# Patient Record
Sex: MALE | Race: WHITE | NOT HISPANIC OR LATINO | URBAN - METROPOLITAN AREA
[De-identification: names, ages, dates, MRNs, and addresses within clinical notes are randomized per-mention and may not be internally consistent; named-entity substitution may affect disease eponyms.]

---

## 2019-03-25 ENCOUNTER — EMERGENCY (EMERGENCY)
Facility: HOSPITAL | Age: 84
LOS: 1 days | Discharge: HOME | End: 2019-03-25
Attending: EMERGENCY MEDICINE
Payer: MEDICARE

## 2019-03-25 VITALS — OXYGEN SATURATION: 100 % | RESPIRATION RATE: 17 BRPM

## 2019-03-25 VITALS — WEIGHT: 143.96 LBS | HEIGHT: 69 IN | TEMPERATURE: 98 F

## 2019-03-25 LAB
ALBUMIN SERPL ELPH-MCNC: 4.3 G/DL — SIGNIFICANT CHANGE UP (ref 3.3–5)
ALP SERPL-CCNC: 76 U/L — SIGNIFICANT CHANGE UP (ref 40–120)
ALT FLD-CCNC: 14 U/L — SIGNIFICANT CHANGE UP (ref 10–45)
ANION GAP SERPL CALC-SCNC: 14 MMOL/L — SIGNIFICANT CHANGE UP (ref 5–17)
APTT BLD: 35 SEC — SIGNIFICANT CHANGE UP (ref 27.5–36.3)
AST SERPL-CCNC: 22 U/L — SIGNIFICANT CHANGE UP (ref 10–40)
BASOPHILS # BLD AUTO: 0 K/UL — SIGNIFICANT CHANGE UP (ref 0–0.2)
BASOPHILS NFR BLD AUTO: 0.4 % — SIGNIFICANT CHANGE UP (ref 0–2)
BILIRUB SERPL-MCNC: 0.8 MG/DL — SIGNIFICANT CHANGE UP (ref 0.2–1.2)
BUN SERPL-MCNC: 20 MG/DL — SIGNIFICANT CHANGE UP (ref 7–23)
CALCIUM SERPL-MCNC: 9.5 MG/DL — SIGNIFICANT CHANGE UP (ref 8.4–10.5)
CHLORIDE SERPL-SCNC: 102 MMOL/L — SIGNIFICANT CHANGE UP (ref 96–108)
CO2 SERPL-SCNC: 22 MMOL/L — SIGNIFICANT CHANGE UP (ref 22–31)
CREAT SERPL-MCNC: 1.05 MG/DL — SIGNIFICANT CHANGE UP (ref 0.5–1.3)
EOSINOPHIL # BLD AUTO: 0.1 K/UL — SIGNIFICANT CHANGE UP (ref 0–0.5)
EOSINOPHIL NFR BLD AUTO: 0.9 % — SIGNIFICANT CHANGE UP (ref 0–6)
GLUCOSE SERPL-MCNC: 150 MG/DL — HIGH (ref 70–99)
HCT VFR BLD CALC: 35.8 % — LOW (ref 39–50)
HGB BLD-MCNC: 12.4 G/DL — LOW (ref 13–17)
INR BLD: 2.36 RATIO — HIGH (ref 0.88–1.16)
LYMPHOCYTES # BLD AUTO: 0.8 K/UL — LOW (ref 1–3.3)
LYMPHOCYTES # BLD AUTO: 10.1 % — LOW (ref 13–44)
MCHC RBC-ENTMCNC: 34.5 GM/DL — SIGNIFICANT CHANGE UP (ref 32–36)
MCHC RBC-ENTMCNC: 34.6 PG — HIGH (ref 27–34)
MCV RBC AUTO: 101 FL — HIGH (ref 80–100)
MONOCYTES # BLD AUTO: 0.7 K/UL — SIGNIFICANT CHANGE UP (ref 0–0.9)
MONOCYTES NFR BLD AUTO: 8.4 % — SIGNIFICANT CHANGE UP (ref 2–14)
NEUTROPHILS # BLD AUTO: 6.5 K/UL — SIGNIFICANT CHANGE UP (ref 1.8–7.4)
NEUTROPHILS NFR BLD AUTO: 80.2 % — HIGH (ref 43–77)
PLATELET # BLD AUTO: 159 K/UL — SIGNIFICANT CHANGE UP (ref 150–400)
POTASSIUM SERPL-MCNC: 3.8 MMOL/L — SIGNIFICANT CHANGE UP (ref 3.5–5.3)
POTASSIUM SERPL-SCNC: 3.8 MMOL/L — SIGNIFICANT CHANGE UP (ref 3.5–5.3)
PROT SERPL-MCNC: 6.8 G/DL — SIGNIFICANT CHANGE UP (ref 6–8.3)
PROTHROM AB SERPL-ACNC: 27.6 SEC — HIGH (ref 10–12.9)
RBC # BLD: 3.57 M/UL — LOW (ref 4.2–5.8)
RBC # FLD: 12.9 % — SIGNIFICANT CHANGE UP (ref 10.3–14.5)
SODIUM SERPL-SCNC: 138 MMOL/L — SIGNIFICANT CHANGE UP (ref 135–145)
WBC # BLD: 8 K/UL — SIGNIFICANT CHANGE UP (ref 3.8–10.5)
WBC # FLD AUTO: 8 K/UL — SIGNIFICANT CHANGE UP (ref 3.8–10.5)

## 2019-03-25 PROCEDURE — 99284 EMERGENCY DEPT VISIT MOD MDM: CPT | Mod: 25,GC

## 2019-03-25 PROCEDURE — 12001 RPR S/N/AX/GEN/TRNK 2.5CM/<: CPT | Mod: RT

## 2019-03-25 PROCEDURE — 73130 X-RAY EXAM OF HAND: CPT | Mod: 26,RT

## 2019-03-25 PROCEDURE — 72125 CT NECK SPINE W/O DYE: CPT | Mod: 26

## 2019-03-25 PROCEDURE — 73110 X-RAY EXAM OF WRIST: CPT

## 2019-03-25 PROCEDURE — 12001 RPR S/N/AX/GEN/TRNK 2.5CM/<: CPT | Mod: GC

## 2019-03-25 PROCEDURE — 80053 COMPREHEN METABOLIC PANEL: CPT

## 2019-03-25 PROCEDURE — 71045 X-RAY EXAM CHEST 1 VIEW: CPT

## 2019-03-25 PROCEDURE — 72125 CT NECK SPINE W/O DYE: CPT

## 2019-03-25 PROCEDURE — 85730 THROMBOPLASTIN TIME PARTIAL: CPT

## 2019-03-25 PROCEDURE — 99284 EMERGENCY DEPT VISIT MOD MDM: CPT | Mod: 25

## 2019-03-25 PROCEDURE — 70450 CT HEAD/BRAIN W/O DYE: CPT | Mod: 26

## 2019-03-25 PROCEDURE — 73130 X-RAY EXAM OF HAND: CPT

## 2019-03-25 PROCEDURE — 85027 COMPLETE CBC AUTOMATED: CPT

## 2019-03-25 PROCEDURE — 73562 X-RAY EXAM OF KNEE 3: CPT | Mod: 26,RT

## 2019-03-25 PROCEDURE — 70450 CT HEAD/BRAIN W/O DYE: CPT

## 2019-03-25 PROCEDURE — 71045 X-RAY EXAM CHEST 1 VIEW: CPT | Mod: 26

## 2019-03-25 PROCEDURE — 73562 X-RAY EXAM OF KNEE 3: CPT

## 2019-03-25 PROCEDURE — 73110 X-RAY EXAM OF WRIST: CPT | Mod: 26,RT

## 2019-03-25 PROCEDURE — 73090 X-RAY EXAM OF FOREARM: CPT

## 2019-03-25 PROCEDURE — 73090 X-RAY EXAM OF FOREARM: CPT | Mod: 26,LT

## 2019-03-25 PROCEDURE — 85610 PROTHROMBIN TIME: CPT

## 2019-03-25 RX ORDER — ACETAMINOPHEN 500 MG
650 TABLET ORAL ONCE
Qty: 0 | Refills: 0 | Status: COMPLETED | OUTPATIENT
Start: 2019-03-25 | End: 2019-03-25

## 2019-03-25 RX ADMIN — Medication 650 MILLIGRAM(S): at 19:25

## 2019-03-25 NOTE — ED PROCEDURE NOTE - ATTENDING CONTRIBUTION TO CARE
This procedure was done by me or under my direct supervision with the resident or the advanced practice clinician.  I discussed the medical necessity of this procedure with the patient or the patient's health care proxy.  Verbal consent for this procedure was obtained after the risks, benefits, and alternatives to this procedure was discussed..  This procedure was perfomed successfully without significant complications.  The patient tolerated the procedure and was observed for a period of time after the procedue.  I discussed with the patient or the health care proxy the later complications of this procedure including the signs and symptoms that would require further medical attention.  The patient or health care proxy appears to understand these issues.
This procedure was done by me or under my direct supervision with the resident or the advanced practice clinician.  I discussed the medical necessity of this procedure with the patient or the patient's health care proxy.  Verbal consent for this procedure was obtained after the risks, benefits, and alternatives to this procedure was discussed..  This procedure was perfomed successfully without significant complications.  The patient tolerated the procedure and was observed for a period of time after the procedue.  I discussed with the patient or the health care proxy the later complications of this procedure including the signs and symptoms that would require further medical attention.  The patient or health care proxy appears to understand these issues.

## 2019-03-25 NOTE — ED ADULT NURSE REASSESSMENT NOTE - NS ED NURSE REASSESS COMMENT FT1
OMER Perera from LVAD team contacted and informed patient is in ED. Pt was implanted by [facility], pt provided charging station for portable batteries.

## 2019-03-25 NOTE — ED PROCEDURE NOTE - PROCEDURE ADDITIONAL DETAILS
ttp R snuffbox region after fall. no acute fx on xray. placed in thumb spica splint with orthoglass. told pt needs repeat xr in 1-2 weeks to eval for occult fx
2cm horizontal laceration distal to DIP on 3rd right finger (neurovascularly intact). placed 3 stitches of 5.0 prolene after digit

## 2019-03-25 NOTE — ED ADULT NURSE NOTE - OBJECTIVE STATEMENT
84 yo male, PMH LVAD (HM2? placed at [facility]) on coumadin presents to ED with right wrist pain, right forehead abrasion s.p trip and fall in backyard. Pt denies LOC, presyncope and dizziness. He states he lost balance and fell forward landing on right hand and hitting head, he also sustained abrasion to right knee. 86 yo male, PMH LVAD (HM2? placed at [facility]) on coumadin presents to ED with right wrist pain, right forehead abrasion s.p trip and fall in backyard. Pt denies LOC, presyncope and dizziness. He states he lost balance and fell forward landing on right hand and hitting head, he also sustained abrasion to right knee. Pt ambulatory in ED. Vital signs are stable

## 2019-03-25 NOTE — ED ADULT NURSE NOTE - ED STAT RN HANDOFF DETAILS
Handoff to night MITA Llamas. Patient has LVAD 2 batteries with him, states they will last 4-6 hours but he need a , night MITA Llamas aware and states she will look into obtaining

## 2019-03-25 NOTE — ED PROVIDER NOTE - CARE PLAN
Principal Discharge DX:	Laceration of finger  Secondary Diagnosis:	Head trauma  Secondary Diagnosis:	Wrist pain, acute, right

## 2019-03-25 NOTE — ED PROVIDER NOTE - NSFOLLOWUPINSTRUCTIONS_ED_ALL_ED_FT
FOLLOW UP WITH PMD  WITHIN 1-2DAYS, CALL TO MAKE APPOINTMENT  COME BACK TO ED IF YOUR CONDITION WORSENS OR IF YOU DEVELOP FEVER GREATER THAN 100.4F, CHEST PAIN,  SHORTNESS OF BREATH OR ANY OTHER SYMPTOMS CONCERNING TO YOU  TAKE TYLENOL (ACETAMINOPHEN) 650 MG EVERY 6 HOURS AS NEEDED FOR PAIN  your 3 stitches need to be removed in 7-10days  you need repeat xray of Right wrist to make sure there is no hidden/occult fracture in 1-2weeks

## 2019-03-25 NOTE — ED PROVIDER NOTE - NS ED ROS FT
Review of Systems:  	•	CONSTITUTIONAL: no fever  	•	SKIN: abrasion, lac  	•	RESPIRATORY: no shortness of breath  	•	CARDIAC: no chest pain, no palpitations  	•	GI:  no abd pain, no nausea, no vomiting, no diarrhea  	•	GENITO-URINARY:  no dysuria; no hematuria    	•	MUSCULOSKELETAL:  R wrist pain  	•	NEUROLOGIC: no weakness  	•	ALLERGY: no rhinitis  	•	PSYSCHIATRIC: no anxiety

## 2019-03-25 NOTE — ED PROVIDER NOTE - PHYSICAL EXAMINATION
*GEN: NAD; well appearing; A+O x3   *HEAD: hematoma with abrasion that's scabbed over on R forehead  *EYES/NOSE: PERRL & EOMI b/l  *THROAT: airway patent, moist mucous membranes  *NECK: Neck supple, no masses  *PULMONARY: CTA b/l, symmetric breath sounds.   *CARDIAC: LVAD in place with machine hum   *ABDOMEN:  ND, NT, soft, no guarding, no rebound, no masses   *BACK: no CVA tenderness, Normal  spine   *EXTREMITIES: symmetric pulses, 2+ dp & radial pulses, capillary refill < 2 seconds, no cyanosis, no edema; ttp R distal ulnar & R snuffbox  *SKIN: 2cm laceration distal to DIP on 3rd right finger (neurovascularly intact),   *NEUROLOGIC: alert, CN 2-12 intact, moves all 4 extremities, full active & passive ROM in all extremities, normal baseline gait  *PSYCH: insight and judgment nl, memory nl, affect nl, thought nl *GEN: NAD; well appearing; A+O x3   *HEAD: hematoma with abrasion that's scabbed over on R forehead  *EYES/NOSE: PERRL & EOMI b/l  *THROAT: airway patent, moist mucous membranes  *NECK: Neck supple, no masses  *PULMONARY: CTA b/l, symmetric breath sounds.   *CARDIAC: LVAD in place with machine hum   *ABDOMEN:  ND, NT, soft, no guarding, no rebound, no masses   *BACK: no CVA tenderness, Normal  spine   *EXTREMITIES: symmetric pulses, 2+ dp & radial pulses, capillary refill < 2 seconds, no cyanosis, no edema; ttp R distal ulnar & R snuffbox  *SKIN: 2cm laceration distal to DIP on 3rd right finger (neurovascularly intact),   *NEUROLOGIC: alert, CN 2-12 intact, moves all 4 extremities, full active & passive ROM in all extremities, normal baseline gait  *PSYCH: insight and judgment nl, memory nl, affect nl, thought nl    Dr Toñito rondon

## 2019-03-25 NOTE — ED PROVIDER NOTE - CLINICAL SUMMARY MEDICAL DECISION MAKING FREE TEXT BOX
ttp R distal ulnar & R snuffbox. 2cm laceration distal to DIP on 3rd right finger (neurovascularly intact), hematoma with abrasion that's scabbed over on R forehead. will eval for ICH, fx & lac repair

## 2019-03-25 NOTE — ED ADULT NURSE NOTE - NSIMPLEMENTINTERV_GEN_ALL_ED
Implemented All Fall with Harm Risk Interventions:  [place] to call system. Call bell, personal items and telephone within reach. Instruct patient to call for assistance. Room bathroom lighting operational. Non-slip footwear when patient is off stretcher. Physically safe environment: no spills, clutter or unnecessary equipment. Stretcher in lowest position, wheels locked, appropriate side rails in place. Provide visual cue, wrist band, yellow gown, etc. Monitor gait and stability. Monitor for mental status changes and reorient to person, place, and time. Review medications for side effects contributing to fall risk. Reinforce activity limits and safety measures with patient and family. Provide visual clues: red socks.

## 2019-03-25 NOTE — ED PROVIDER NOTE - OBJECTIVE STATEMENT
Pertinent PMH/PSH/FHx/SHx and Review of Systems contained within  85yoM PMH LVAD on coumadin bib daughter for fall with head trauma. at 10am pt was raking leaves, bent over to  leaves & fell forward with head trauma. c/o pain at R wrist, R knee. pt was fine all day. when daughter saw head bump this afternoon brought pt to ed. unknown exact tetanus but believes he mightve received it prior to lvad  ROS negative for: fever, Chest pain, SOB, Nausea, vomiting, diarrhea, abdominal pain, dysuria, HA, recent illness, syncope , hip pain  FamilyHx and SocialHx not otherwise contributory

## 2019-03-26 NOTE — ED POST DISCHARGE NOTE - DETAILS
patient in thumb spica, not protecting correct joint. recommended return for resplint. patient demonstrates understanding of plan - Cherelle Andre PA-C

## 2022-06-23 NOTE — ED PROCEDURE NOTE - NS ED ATTENDING STATEMENT MOD
Chronic. Controlled. Follow up with Cardiology/PCP.  
I have personally seen and examined this patient.  I have fully participated in the care of this patient. I have reviewed all pertinent clinical information, including history, physical exam, plan and the Resident’s note and agree except as noted.
I have personally seen and examined this patient.  I have fully participated in the care of this patient. I have reviewed all pertinent clinical information, including history, physical exam, plan and the Resident’s note and agree except as noted.

## 2024-11-21 NOTE — ED PROVIDER NOTE - ATTENDING CONTRIBUTION TO CARE
Performed By: Shahram MOON CDT Lot # (Optional): [id number] Urine Pregnancy Test Result: negative Expiration Date (Optional): 2025-11-12 Detail Level: None I have seen and evaluated this patient with the resident.   I agree with the findings  unless other wise stated.  I have made appropriate changes in documentations where needed, After my face to face bedside evaluation, I am further  noting: Pt with mechanical fall on out stretched arms ttp R distal ulnar & R snuffbox. 2cm laceration distal to DIP on 3rd right finger (neurovascularly intact), hematoma with abrasion that's scabbed over on R forehead. see detailed exam will eval for ICH, fx & lac repair done -- [place]